# Patient Record
Sex: FEMALE | Race: WHITE | NOT HISPANIC OR LATINO | URBAN - METROPOLITAN AREA
[De-identification: names, ages, dates, MRNs, and addresses within clinical notes are randomized per-mention and may not be internally consistent; named-entity substitution may affect disease eponyms.]

---

## 2022-08-01 ENCOUNTER — APPOINTMENT (RX ONLY)
Dept: URBAN - METROPOLITAN AREA CLINIC 352 | Facility: CLINIC | Age: 37
Setting detail: DERMATOLOGY
End: 2022-08-01

## 2022-08-01 DIAGNOSIS — L71.8 OTHER ROSACEA: ICD-10-CM | Status: INADEQUATELY CONTROLLED

## 2022-08-01 DIAGNOSIS — L81.1 CHLOASMA: ICD-10-CM

## 2022-08-01 PROCEDURE — ? COUNSELING

## 2022-08-01 PROCEDURE — ? PRESCRIPTION

## 2022-08-01 PROCEDURE — 99204 OFFICE O/P NEW MOD 45 MIN: CPT

## 2022-08-01 RX ORDER — METRONIDAZOLE 7.5 MG/G
CREAM TOPICAL BID
Qty: 45 | Refills: 2 | Status: ERX | COMMUNITY
Start: 2022-08-01

## 2022-08-01 RX ADMIN — METRONIDAZOLE: 7.5 CREAM TOPICAL at 00:00

## 2022-08-01 ASSESSMENT — LOCATION DETAILED DESCRIPTION DERM
LOCATION DETAILED: RIGHT CENTRAL MALAR CHEEK
LOCATION DETAILED: SUPERIOR MID FOREHEAD
LOCATION DETAILED: LEFT CENTRAL MALAR CHEEK

## 2022-08-01 ASSESSMENT — LOCATION ZONE DERM: LOCATION ZONE: FACE

## 2022-08-01 ASSESSMENT — LOCATION SIMPLE DESCRIPTION DERM
LOCATION SIMPLE: LEFT CHEEK
LOCATION SIMPLE: RIGHT CHEEK
LOCATION SIMPLE: SUPERIOR FOREHEAD

## 2022-08-01 NOTE — PROCEDURE: REASSURANCE
Additional Notes (Optional): Patient was advised to wait until rosacea is under control before treatment. And advised to use sunscreen daily on areas of discoloration
Hide Additional Notes?: No
Detail Level: Zone

## 2022-09-12 ENCOUNTER — APPOINTMENT (RX ONLY)
Dept: URBAN - METROPOLITAN AREA CLINIC 352 | Facility: CLINIC | Age: 37
Setting detail: DERMATOLOGY
End: 2022-09-12

## 2022-09-12 DIAGNOSIS — L71.8 OTHER ROSACEA: ICD-10-CM | Status: INADEQUATELY CONTROLLED

## 2022-09-12 PROCEDURE — ? PRESCRIPTION

## 2022-09-12 PROCEDURE — 99214 OFFICE O/P EST MOD 30 MIN: CPT

## 2022-09-12 PROCEDURE — ? PRESCRIPTION MEDICATION MANAGEMENT

## 2022-09-12 PROCEDURE — ? COUNSELING

## 2022-09-12 RX ORDER — METRONIDAZOLE 7.5 MG/G
CREAM TOPICAL BID
Qty: 45 | Refills: 2 | Status: ERX

## 2022-09-12 RX ORDER — DOXYCYCLINE HYCLATE 20 MG/1
TABLET, FILM COATED ORAL
Qty: 60 | Refills: 2 | Status: ERX | COMMUNITY
Start: 2022-09-12

## 2022-09-12 RX ADMIN — DOXYCYCLINE HYCLATE: 20 TABLET, FILM COATED ORAL at 00:00

## 2022-09-12 ASSESSMENT — LOCATION SIMPLE DESCRIPTION DERM
LOCATION SIMPLE: LEFT CHEEK
LOCATION SIMPLE: RIGHT CHEEK

## 2022-09-12 ASSESSMENT — LOCATION DETAILED DESCRIPTION DERM
LOCATION DETAILED: LEFT CENTRAL MALAR CHEEK
LOCATION DETAILED: RIGHT CENTRAL MALAR CHEEK

## 2022-09-12 ASSESSMENT — LOCATION ZONE DERM: LOCATION ZONE: FACE

## 2022-09-12 NOTE — PROCEDURE: PRESCRIPTION MEDICATION MANAGEMENT
Render In Strict Bullet Format?: No
Initiate Treatment: doxycycline hyclate 20 mg tablet \\nQuantity: 60.0 Tablet\\nSig: Take one tab by mouth with food and full glass of water do not lay down for 1 hour after taking medication RF:3
Detail Level: Zone
Continue Regimen: metronidazole 0.75 % topical cream BID\\nQuantity: 45.0 g  Days Supply: 30\\nSig: Apply to face bid